# Patient Record
Sex: FEMALE | Race: WHITE | NOT HISPANIC OR LATINO | Employment: FULL TIME | URBAN - METROPOLITAN AREA
[De-identification: names, ages, dates, MRNs, and addresses within clinical notes are randomized per-mention and may not be internally consistent; named-entity substitution may affect disease eponyms.]

---

## 2022-05-12 DIAGNOSIS — Z34.90 PREGNANCY, UNSPECIFIED GESTATIONAL AGE: Primary | ICD-10-CM

## 2022-05-13 ENCOUNTER — OFFICE VISIT (OUTPATIENT)
Dept: OBSTETRICS AND GYNECOLOGY | Facility: CLINIC | Age: 22
End: 2022-05-13

## 2022-05-13 VITALS
WEIGHT: 133 LBS | HEIGHT: 62 IN | DIASTOLIC BLOOD PRESSURE: 60 MMHG | SYSTOLIC BLOOD PRESSURE: 108 MMHG | BODY MASS INDEX: 24.48 KG/M2

## 2022-05-13 DIAGNOSIS — O02.1 MISSED ABORTION: Primary | ICD-10-CM

## 2022-05-13 PROCEDURE — 99204 OFFICE O/P NEW MOD 45 MIN: CPT | Performed by: OBSTETRICS & GYNECOLOGY

## 2022-05-13 RX ORDER — PRENATAL VIT/IRON FUM/FOLIC AC 27MG-0.8MG
TABLET ORAL DAILY
COMMUNITY

## 2022-05-13 RX ORDER — ECHINACEA PURPUREA EXTRACT 125 MG
1 TABLET ORAL AS NEEDED
COMMUNITY

## 2022-05-13 RX ORDER — LORATADINE 10 MG/1
CAPSULE, LIQUID FILLED ORAL
COMMUNITY

## 2022-05-13 RX ORDER — MISOPROSTOL 200 UG/1
TABLET ORAL
Qty: 8 TABLET | Refills: 0 | Status: SHIPPED | OUTPATIENT
Start: 2022-05-13

## 2022-05-13 NOTE — PROGRESS NOTES
"Chief Complaint   Patient presents with   • Miscarriage          HPI  Cheryl Lr is a 22 y.o. female, No obstetric history on file., who presents with U/S without fetal heart tones.  Patient states that she has regular menses her last menstrual period is on 3/5/2022 had her first hCG done on 4/14/2022.  Is been followed with weekly ultrasounds at [place] and had ultrasound on 5/6/2022 that showed a fetal pole with cardiac activity measuring weeks and 3 days.  She was told the yolk sac was abnormally large.  She denies any bleeding or spotting.    Recent Tests:  She had a urine pregnancy test that was done on 4/14/22 day ago that was positive..    US today: Yes.  Findings showed nonviable IUP measuring 6 weeks and 1 day..  I have personally evaluated the U/S and agree with the findings. Lisandro Polanco MD  She has not had prenatal care.  She denies associated abdominal or pelvic pain.. Her past medical history is non-contributory.  She does not have passage of tissue.  Rh Status: unkown  She reports no additional symptoms or complaints.    The additional following portions of the patient's history were reviewed and updated as appropriate: allergies, current medications, past family history, past medical history, past social history, past surgical history and problem list.    Review of Systems   Constitutional: Negative for chills and fever.   Respiratory: Negative.    Cardiovascular: Negative.    Gastrointestinal: Negative for abdominal distention and abdominal pain.   Genitourinary: Negative.    Psychiatric/Behavioral: Negative for dysphoric mood and depressed mood.     All other systems reviewed and are negative.     I have reviewed and agree with the HPI, ROS, and historical information as entered above. Lisandro Polanco MD    Objective   /60   Ht 157.5 cm (62\")   Wt 60.3 kg (133 lb)   LMP 03/05/2022 (Exact Date)   Breastfeeding No   BMI 24.33 kg/m²     Physical Exam  Vitals and nursing note " reviewed.   Constitutional:       Appearance: She is well-developed.   HENT:      Head: Normocephalic and atraumatic.   Cardiovascular:      Rate and Rhythm: Normal rate and regular rhythm.      Heart sounds: Normal heart sounds.   Pulmonary:      Effort: Pulmonary effort is normal.      Breath sounds: Normal breath sounds.   Abdominal:      Palpations: Abdomen is soft. Abdomen is not rigid.   Musculoskeletal:      Cervical back: Normal range of motion.   Neurological:      Mental Status: She is alert and oriented to person, place, and time.   Psychiatric:         Behavior: Behavior normal.            Assessment and Plan    Problem List Items Addressed This Visit     Missed  - Primary    Overview     Ultrasound 2022.  Fetal pole with crown-rump length measuring 6 weeks and 1 day with no fetal cardiac activity.  9 weeks 6 days by LMP.  Been getting weekly ultrasounds in [place] and states cardiac activity was visible on ultrasound 2022.    Maternal blood type unknown.  Type and Rh today.    Options reviewed including observation, suction D&C, and Cytotec to induce miscarriage.  Patient declines D&C at this time and wishes to try Cytotec.           Current Assessment & Plan     Rx for Cytotec 200 mg sent to pharmacy with instructions.  Insert 4 tablets intravaginally; may repeat repeat x1 if miscarriage not complete within 24 hours.  Return for ultrasound in office today after miscarriage to confirm complete AB.    Blood type and CBC done today.  Discussed need for RhoGAM within 24 to 48 hours if  Rh Negative.           Relevant Orders    CBC (No Diff)    ABO / Rh    Chlamydia trachomatis, Neisseria gonorrhoeae, PCR w/ confirmation - Urine, Urine, Clean Catch    US Non-ob Transvaginal          1. Missed AB blood type unknown.  Blood type and Rh done today.  2. Repeat U/S in 1 week(s).  3. Call for an increase in bleeding, abdominal pain, or fever.  4. Options discussed with patient.  5. Cytotec  prescribed.  Bleeding precautions reviewed.  6. Report to ED if saturating a pad greater than every 30-60 mins.  7. Call for ultrasound day after completes miscarriage to confirm completed miscarriage  No follow-ups on file.    Counseling was given to patient and family for the following topics: instructions for management and risks and benefits of treatment options     Lisandro Polanco MD  05/13/2022

## 2022-05-13 NOTE — ASSESSMENT & PLAN NOTE
Rx for Cytotec 200 mg sent to pharmacy with instructions.  Insert 4 tablets intravaginally; may repeat repeat x1 if miscarriage not complete within 24 hours.  Return for ultrasound in office today after miscarriage to confirm complete AB.    Blood type and CBC done today.  Discussed need for RhoGAM within 24 to 48 hours if  Rh Negative.

## 2022-05-17 LAB
ABO GROUP BLD: NORMAL
C TRACH RRNA SPEC QL NAA+PROBE: NEGATIVE
ERYTHROCYTE [DISTWIDTH] IN BLOOD BY AUTOMATED COUNT: 11.7 % (ref 12.3–15.4)
HCT VFR BLD AUTO: 37.4 % (ref 34–46.6)
HGB BLD-MCNC: 12.5 G/DL (ref 12–15.9)
MCH RBC QN AUTO: 29.9 PG (ref 26.6–33)
MCHC RBC AUTO-ENTMCNC: 33.4 G/DL (ref 31.5–35.7)
MCV RBC AUTO: 89.5 FL (ref 79–97)
N GONORRHOEA RRNA SPEC QL NAA+PROBE: NEGATIVE
PLATELET # BLD AUTO: 253 10*3/MM3 (ref 140–450)
RBC # BLD AUTO: 4.18 10*6/MM3 (ref 3.77–5.28)
RH BLD: POSITIVE
WBC # BLD AUTO: 3.62 10*3/MM3 (ref 3.4–10.8)